# Patient Record
Sex: FEMALE | Race: OTHER
[De-identification: names, ages, dates, MRNs, and addresses within clinical notes are randomized per-mention and may not be internally consistent; named-entity substitution may affect disease eponyms.]

---

## 2020-08-21 ENCOUNTER — HOSPITAL ENCOUNTER (EMERGENCY)
Dept: HOSPITAL 72 - EMR | Age: 30
Discharge: HOME | End: 2020-08-21
Payer: MEDICAID

## 2020-08-21 VITALS — SYSTOLIC BLOOD PRESSURE: 121 MMHG | DIASTOLIC BLOOD PRESSURE: 78 MMHG

## 2020-08-21 VITALS — HEIGHT: 60 IN | WEIGHT: 135 LBS | BODY MASS INDEX: 26.5 KG/M2

## 2020-08-21 DIAGNOSIS — G51.0: Primary | ICD-10-CM

## 2020-08-21 PROCEDURE — 99282 EMERGENCY DEPT VISIT SF MDM: CPT

## 2020-08-21 NOTE — NUR
ED Nurse Note:



Pt ambulated to ED from home d/t facial drooping and right eye twitching x 2 
days. No medical hx stated. Pt is AOx4, calm and cooperative, pt's vss, on ra, 
afebrile on triage. Placed on bed, hooked to cardiac monitor.

## 2020-08-21 NOTE — EMERGENCY ROOM REPORT
History of Present Illness


General


Chief Complaint:  General Complaint


Source:  Patient





Present Illness


HPI


30-year-old otherwise healthy female here with left-sided facial weakness for 2 

days.  Patient says that 2 days ago she woke and noticed that her left side of 

her face felt "numb and tingly" and said that she was unable to fully close her 

eye.  She says that throughout the day this weakness worsened and now includes 

left forehead, left mid face, left lower face.  Has never had this before.  No 

vesicular skin rashes or other eruptions.  She is sexually active with her 

 only and he has had no symptoms.  Denies any other focal numbness or 

weakness.  No vision changes.  No headaches, fevers, chills, other paresthesias

, chest pain, palpitation, shortness of breath, back pain, abdominal pain, 

nausea, vomiting, diarrhea, dysuria.


Allergies:  


Coded Allergies:  


     No Known Allergies (Unverified , 8/21/20)





COVID-19 Screening


Contact w/high risk pt:  No


Experienced COVID-19 symptoms?:  No


COVID-19 Testing performed PTA:  No





Patient History


Pregnant Now:  No





Nursing Documentation-[facility]


Past Medical History:  No Stated History





Review of Systems


All Other Systems:  negative except mentioned in HPI





Physical Exam





Vital Signs








  Date Time  Temp Pulse Resp B/P (MAP) Pulse Ox O2 Delivery O2 Flow Rate FiO2


 


8/21/20 18:52 98.8 71 19 121/78 (92) 99 Room Air  








Sp02 EP Interpretation:  reviewed, normal


General Appearance:  no apparent distress, alert, GCS 15, non-toxic


Head:  normocephalic, atraumatic


Eyes:  bilateral eye normal inspection, bilateral eye PERRL


ENT:  hearing grossly normal, normal pharynx, no angioedema, normal voice


Neck:  full range of motion, supple/symm/no masses


Respiratory:  chest non-tender, lungs clear, normal breath sounds, speaking 

full sentences


Cardiovascular #1:  regular rate, rhythm, no edema


Cardiovascular #2:  2+ carotid (R), 2+ carotid (L), 2+ radial (R), 2+ radial (L)

, 2+ dorsalis pedis (R), 2+ dorsalis pedis (L)


Gastrointestinal:  normal bowel sounds, non tender, soft, non-distended, no 

guarding, no rebound


Rectal:  deferred


Musculoskeletal:  back normal, normal range of motion, calf tenderness, gait/

station normal, non-tender


Neurologic:  alert, motor strength/tone normal, oriented x3, responsive, speech 

normal, other - Patient unable to fully raise left eyebrow.  Weakness of the 

left forehead musculature.  Unable to fully close left eyelids.  Left lower 

facial droop.  No right facial deficits.  No other focal numbness or weakness.  

No pronator drift.  Normal gait.


Psychiatric:  judgement/insight normal, memory normal, mood/affect normal, no 

suicidal/homicidal ideation





Medical Decision Making


Diagnostic Impression:  


 Primary Impression:  


 Bell's palsy


ER Course


30-year-old female here with left-sided facial weakness.  Patient's vagal 

shoulder weakness has been ongoing for 2 days.  Denies fevers or chills or any 

vesicular eruptions.-year-old facial weakness of the entire left face including 

the left forehead without any forehead sparing whatsoever.  Rest of physical 

examination was completely unremarkable.  Patient no headaches or vision 

changes or other focal numbness or weakness.  Findings consistent with Bell's 

palsy.  Very low likelihood of CVA given the lack of any other findings and 

lack of forehead sparing.  Patient was given prescription for artificial tears, 

7 days of prednisone and acyclovir.  She was told that she needs an eye 

covering which she can get over-the-counter at the pharmacy.  Told to come back 

to the emergency department she has any headaches, blurry vision, other focal 

numbness or weakness, word finding difficulty.  She expressed understanding and 

was discharged.





Last Vital Signs








  Date Time  Temp Pulse Resp B/P (MAP) Pulse Ox O2 Delivery O2 Flow Rate FiO2


 


8/21/20 19:05  71 19   Room Air  


 


8/21/20 19:05 98.8   121/78 99   








Scripts


Dextran 70/Hypromellose (ARTIFICIAL TEARS) 1 Each Droperette


1 EACH OP Q1HR for 7 Days, UNIT


   Prov: Wilbur Sr M.D.         8/21/20 


Acyclovir* (ACYCLOVIR*) 400 Mg Tablet


400 MG ORAL FIVE TIMES A DAY for 7 Days, TAB


   Prov: Wilbur Sr M.D.         8/21/20 


Prednisone* (PREDNISONE*) 20 Mg Tablet


60 MG ORAL DAILY for 7 Days, #21 TAB


   Prov: Wilbur Sr M.D.         8/21/20


Referrals:  


NOT CHOSEN IPA/MD,REFERRING (PCP)











Wilbur Sr M.D. Aug 21, 2020 19:26